# Patient Record
Sex: FEMALE | Race: WHITE | NOT HISPANIC OR LATINO | Employment: FULL TIME | URBAN - METROPOLITAN AREA
[De-identification: names, ages, dates, MRNs, and addresses within clinical notes are randomized per-mention and may not be internally consistent; named-entity substitution may affect disease eponyms.]

---

## 2021-06-03 ENCOUNTER — RECORDS - HEALTHEAST (OUTPATIENT)
Dept: ADMINISTRATIVE | Facility: CLINIC | Age: 31
End: 2021-06-03

## 2023-05-04 ENCOUNTER — HOSPITAL ENCOUNTER (EMERGENCY)
Facility: HOSPITAL | Age: 33
Discharge: HOME | End: 2023-05-04
Attending: EMERGENCY MEDICINE | Admitting: EMERGENCY MEDICINE
Payer: COMMERCIAL

## 2023-05-04 VITALS
HEART RATE: 89 BPM | RESPIRATION RATE: 16 BRPM | BODY MASS INDEX: 44.41 KG/M2 | SYSTOLIC BLOOD PRESSURE: 145 MMHG | DIASTOLIC BLOOD PRESSURE: 84 MMHG | OXYGEN SATURATION: 99 % | HEIGHT: 68 IN | WEIGHT: 293 LBS | TEMPERATURE: 98.3 F

## 2023-05-04 DIAGNOSIS — S61.001A AVULSION OF SKIN OF RIGHT THUMB, INITIAL ENCOUNTER: ICD-10-CM

## 2023-05-04 PROCEDURE — 90471 IMMUNIZATION ADMIN: CPT | Performed by: EMERGENCY MEDICINE

## 2023-05-04 PROCEDURE — 272N000004 HC RX 272: Performed by: EMERGENCY MEDICINE

## 2023-05-04 PROCEDURE — 250N000011 HC RX IP 250 OP 636: Performed by: EMERGENCY MEDICINE

## 2023-05-04 PROCEDURE — 90715 TDAP VACCINE 7 YRS/> IM: CPT | Performed by: EMERGENCY MEDICINE

## 2023-05-04 PROCEDURE — 99283 EMERGENCY DEPT VISIT LOW MDM: CPT | Mod: 25

## 2023-05-04 RX ADMIN — Medication 1 EACH: at 02:03

## 2023-05-04 RX ADMIN — CLOSTRIDIUM TETANI TOXOID ANTIGEN (FORMALDEHYDE INACTIVATED), CORYNEBACTERIUM DIPHTHERIAE TOXOID ANTIGEN (FORMALDEHYDE INACTIVATED), BORDETELLA PERTUSSIS TOXOID ANTIGEN (GLUTARALDEHYDE INACTIVATED), BORDETELLA PERTUSSIS FILAMENTOUS HEMAGGLUTININ ANTIGEN (FORMALDEHYDE INACTIVATED), BORDETELLA PERTUSSIS PERTACTIN ANTIGEN, AND BORDETELLA PERTUSSIS FIMBRIAE 2/3 ANTIGEN 0.5 ML: 5; 2; 2.5; 5; 3; 5 INJECTION, SUSPENSION INTRAMUSCULAR at 01:51

## 2023-05-04 ASSESSMENT — ENCOUNTER SYMPTOMS: WOUND: 1

## 2023-05-04 NOTE — Clinical Note
Daria Sepulveda was seen and treated in our emergency department on 5/4/2023.  She may return to work on 05/05/2023.       If you have any questions or concerns, please don't hesitate to call.      Hugh Silva MD

## 2023-05-04 NOTE — DISCHARGE INSTRUCTIONS
Recommend dressing changes daily and leave the underlying gauze will help with clotting on the wound until it falls off on its own.  Recommend continue dressing changes and monitoring for any signs of infection.  As well your tetanus was updated in the ER.

## 2023-05-04 NOTE — ED PROVIDER NOTES
NAME: Daria Sepulveda  AGE: 32 year old female  YOB: 1990  MRN: [id number]  EVALUATION DATE & TIME: 2023  1:34 AM    PCP: No primary care provider on file.    ED PROVIDER: Hugh Silva M.D.      Chief Complaint   Patient presents with     Laceration     FINAL IMPRESSION:  1. Avulsion of skin of right thumb, initial encounter      MEDICAL DECISION MAKIN:44 AM I met with the patient, obtained history, performed an initial exam, and discussed options and plan for diagnostics and treatment here in the ED.   Patient was clinically assessed and consented to treatment. After assessment, medical decision making and workup were discussed with the patient. The patient was agreeable to plan for testing, workup, and treatment.  Pertinent Labs & Imaging studies reviewed. (See chart for details)    1:50 AM I repaired patient's laceration.       Medical Decision Making    History:    Supplemental history from: N/A    External Record(s) reviewed: Documented in chart, if applicable.    Work Up:    Chart documentation includes differential considered and any EKGs or imaging independently interpreted by provider, where specified.    In additional to work up documented, I considered the following work up: Documented in chart, if applicable.    External consultation:    Discussion of management with another provider: Documented in chart, if applicable    Complicating factors:    Care impacted by chronic illness: N/A    Care affected by social determinants of health: N/A    Disposition considerations: Discharge. No recommendations on prescription strength medication(s). See documentation for any additional details.    Daria Sepulveda is a 32 year old female who presents with laceration.   Differential diagnosis includes but not limited to fingertip avulsion, laceration, nailbed injury, wound contamination.  Patient with skin avulsion to the tip of the right thumb.  Is a very slight and small  oval-shaped wound with no tissue to replace.  It did take a small bit of the nail but did not involve the nailbed.  After removal of dressing the bleeding did seem to be controlled and no obvious contamination.  Wound was soaked and cleansed thoroughly then there was a little bit of oozing at the wound bed.  Small square of Surgifoam was placed over this to help with occlusion followed by nonstick gauze and then finger gauze dressing.  Patient will keep this on overnight and change dressing daily after that.  She has no history of coagulopathies or blood thinners.  No oozing of blood through the wound and patient will be plan for discharge home with follow-up wound care.  She also had tetanus updated.    0 minutes of critical care time    MEDICATIONS GIVEN IN THE EMERGENCY:  Medications   Tdap (tetanus-diphtheria-acell pertussis) (ADACEL) injection 0.5 mL (0.5 mLs Intramuscular $Given 5/4/23 0151)   gelatin absorbable (GELFOAM) sponge 1 each (1 each Topical $Given 5/4/23 0201)       NEW PRESCRIPTIONS STARTED AT TODAY'S ER VISIT:  There are no discharge medications for this patient.         =================================================================    HPI    Patient information was obtained from: patient     Use of : N/A         Daria Sepulveda is a 32 year old female with a past medical history of tobacco use, who presents to the ED for evaluation of laceration.    Patient reports she was at work around ~12:30 AM this morning when she accidentally scraped her right thumb on a tape dispenser. She reports the tip of her right thumb was cut off and that she has been unable to stop the bleeding. Patient denies additional medical concerns or complaints at this time.      Per [facility], patient's most recent tetanus booster was April 2013.      REVIEW OF SYSTEMS   Review of Systems   Skin: Positive for wound (laceration to right thumb tip).   All other systems reviewed and are negative.       PAST MEDICAL  "HISTORY:  No past medical history on file.    PAST SURGICAL HISTORY:  No past surgical history on file.    CURRENT MEDICATIONS:    No current facility-administered medications for this encounter.  No current outpatient medications on file.    ALLERGIES:  Allergies   Allergen Reactions     Shellfish Allergy Other (See Comments)       FAMILY HISTORY:  No family history on file.    SOCIAL HISTORY:   Social History     Socioeconomic History     Marital status: Single       PHYSICAL EXAM:    Vitals: BP (!) 145/84   Pulse 89   Temp 98.3  F (36.8  C) (Temporal)   Resp 16   Ht 1.727 m (5' 8\")   Wt 136.1 kg (300 lb)   LMP 05/02/2023 (Exact Date)   SpO2 99%   BMI 45.61 kg/m     Physical Exam  Vitals and nursing note reviewed.   Constitutional:       General: She is not in acute distress.     Appearance: Normal appearance. She is normal weight.   HENT:      Head: Atraumatic.   Cardiovascular:      Pulses: Normal pulses.   Musculoskeletal:        Hands:    Skin:     General: Skin is warm and dry.      Findings: No erythema.   Neurological:      Mental Status: She is alert.      Sensory: No sensory deficit.   Psychiatric:         Behavior: Behavior normal.           LAB:  All pertinent labs reviewed and interpreted.  Labs Ordered and Resulted from Time of ED Arrival to Time of ED Departure - No data to display    RADIOLOGY:  No orders to display           I, Soraya Lovett, am serving as a scribe to document services personally performed by Dr. Hugh Silva  based on my observation and the provider's statements to me. Hugh BARNES MD attest that Soraya Lovett is acting in a scribe capacity, has observed my performance of the services and has documented them in accordance with my direction.      Hugh Silva M.D.  Emergency Medicine  [facility] Emergency Department     Hugh Silva MD  05/04/23 0513    "

## 2023-05-04 NOTE — ED TRIAGE NOTES
"Patient arrives from work. Approximately one hour ago was by coworker and right thumb hit blade of tape dispenser.   States small \"chunk\" of skin sliced off and unable to stop bleeding while at work.     Bleeding controlled with bandage in triage.   Unsure of last tetanus.    "